# Patient Record
Sex: FEMALE | Race: WHITE | Employment: OTHER | URBAN - METROPOLITAN AREA
[De-identification: names, ages, dates, MRNs, and addresses within clinical notes are randomized per-mention and may not be internally consistent; named-entity substitution may affect disease eponyms.]

---

## 2017-02-16 ENCOUNTER — PRE VISIT (OUTPATIENT)
Dept: SURGERY | Facility: CLINIC | Age: 47
End: 2017-02-16

## 2017-02-16 NOTE — TELEPHONE ENCOUNTER
Records received from [facility].  Included:  Office notes 11/23/15, 1/5/16, 2/25/16, 11/29/16  12/14/15 notes from [facility]   Robotic ventral rectopexy, posterior colpopexy op-note on 1/13/16- [facility]  [facility]   CT abdomen pelvis on 11/30/16- [facility]  Defecography on 12/14/15    Missing records:  Flexible sigmoidoscopy 10/1/15  Colonoscopy 1/1/14    Faxed request for missing records and CT.

## 2017-02-16 NOTE — TELEPHONE ENCOUNTER
1.  Date/reason for appt: 2/20/17 rectal polyps   2.  Referring provider: NATE BASHIR  3.  Call to patient (Yes / No - short description): no, transferred from [facility]   4.  Previous care at / records requested from: [facility] and [facility]   5.  Other: Spoke with patient, she stated [facility] will be sending records today and she will call [facility] to have them fax records.

## 2017-02-17 NOTE — TELEPHONE ENCOUNTER
Records received from [facility], will forward to clinic.  Included:  Colonoscopy on 2/2/17 with pathology  US pelvic on 12/14/16  ED note on 11/29/16  CT abdomen pelvis on 11/30/16  Labs   Office notes on 4/25/14, 10/29/15  Pathology on 12/27/16    Faxed request to [facility].

## 2017-02-20 ENCOUNTER — OFFICE VISIT (OUTPATIENT)
Dept: SURGERY | Facility: CLINIC | Age: 47
End: 2017-02-20

## 2017-02-20 VITALS
BODY MASS INDEX: 23.13 KG/M2 | WEIGHT: 147.4 LBS | HEIGHT: 67 IN | OXYGEN SATURATION: 100 % | SYSTOLIC BLOOD PRESSURE: 129 MMHG | DIASTOLIC BLOOD PRESSURE: 76 MMHG | HEART RATE: 73 BPM

## 2017-02-20 DIAGNOSIS — K62.1 RECTAL POLYP: Primary | ICD-10-CM

## 2017-02-20 ASSESSMENT — PAIN SCALES - GENERAL: PAINLEVEL: MILD PAIN (3)

## 2017-02-20 NOTE — PROGRESS NOTES
Colon and [facility]al Surgery Clinic Note    RE: Sayda Rowe.  : 1970.  JEREMÍAS: 2017.    Reason for visit: Chronic RLQ abdominal pain.    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 Booker), who presented in 2016 with a 4-month h/o RLQ pain associated with abdominal distension. Pain only occurs upon straining to have a BM. Underwent CT, pelvic US and colonoscopy that showed a 2-cm right ovarian cyst, possible stump appendicitis, and colonoscopy was aborted at the descending colon given significant tortuosity and pain during the exam. Random endoscopic biopsies of the left colon were normal. Diagnostic laparoscopy was performed on 16 and showed RLQ adhesions, no appendix, and a small right ovarian cyst that was excised. RLQ pain persisted after this. Denies fevers, chills, recurrent full-thickness rectal prolapse, or BPR. Does acknowledge stress urinary incontinence, fecal urgency, and sensation of incomplete evacuation. Currently having 2 BM's in AM and 2 BM's at night (mushy). Does have to strain to have a BM but this not anything new for Sayda. Current GI meds include Dulcolax tabs 2 caps BID. Denies FH of CRC, polyps, or IBD.    Medical history:  Past Medical History   Diagnosis Date     Abdominal bloating      Abdominal pain, generalized      Alcohol dependence (H)      Anal fissure      Anxiety      Bipolar 1 disorder (H)      Cough      Eyelid disorder      Fatigue      Herpes simplex type 1 infection      History of appendectomy      History of endometriosis      History of fibromyalgia      History of laparoscopic cholecystectomy      History of laparoscopy      History of parotid gland excision      History of tonsillectomy and adenoidectomy      Migraine headache      Prolapsed internal hemorrhoids      Rectal prolapse        Surgical history:  Past Surgical History   Procedure Laterality Date     Davinci rectopexy N/A 2016      "Procedure: DAVINCI XI RECTOPEXY;  Surgeon: Leslie Booker MD;  Location:  OR       Family history:  Family History   Problem Relation Age of Onset     Family history unknown: Yes       Medications:  Current Outpatient Prescriptions   Medication Sig Dispense Refill     oxyCODONE-acetaminophen (PERCOCET) 5-325 MG per tablet Take 1 tablet by mouth every 6 hours as needed for moderate to severe pain 40 tablet 0     BUSPIRONE HCL PO Take 5 mg by mouth 2 times daily       multivitamin, therapeutic (THERA-VIT) TABS Take 1 tablet by mouth daily       CITALOPRAM HYDROBROMIDE PO Take 30 mg by mouth daily 1 & 1/2 of 20 mg       LAMOTRIGINE PO Take 100 mg by mouth every morning       LAMOTRIGINE PO Take 50 mg by mouth every evening       VALACYCLOVIR HCL PO Take 2,000 mg by mouth every 12 hours For 24 hours. Herpes outbreaks         Allergies:  Allergies   Allergen Reactions     Codeine      Erythromycin      Penicillins        Social history:   Social History   Substance Use Topics     Smoking status: Current Every Day Smoker     Packs/day: 0.50     Types: Cigarettes     Smokeless tobacco: Not on file     Alcohol use Yes      Comment: 2 beers/week     Marital status: single.    Physical Examination:  /76 (BP Location: Left arm)  Pulse 73  Ht 5' 7\"  Wt 147 lb 6.4 oz  SpO2 100%  BMI 23.09 kg/m2  General: Well hydrated. No acute distress.  HEENT: Normocephalic, EOM's intact. Non icteric conjunctiva. EAC's with no abnormalities. Oral mucosa well hydrated, with no exudates visualized. No cervical adenopathy palpated.  Chest: RRR. S1 and S2 normal. No murmurs. Good breath sounds bilaterally with no rhonchi or wheezing.   Abdomen: Soft, slightly distended, tender at RLQ but no rebound or guarding. No inguinal adenopathy palpated.  Extremities: Normotrophic. Distal pulses intact. ROM intact.    Investigations:  None.    Procedures:  None.    ASSESSMENT  48 y/o F with a h/o rectal prolapse, s/p ventral rectopexy in 1/2016 " now with chronic RLQ abdominal during BM's. Despite thorough w/u including extensive imaging, Flex Sig, and Dx laparoscopy; no clear etiology of her pain.     PLAN  1. Schedule for MR defecography and full colonoscopy. I will call her after these. If normal, will need referral to our pain clinic.    Time spent: 60 minutes.  >50% spent in discussing, counseling and coordinating care.    Brandan Resendiz M.D., M.Sc.     Division of Colon and Rectal Surgery  [facility]    Referring Provider:  Mitchell Boland  [facility]  [address] [place] [address]     Primary Care Provider:  Mitchell Boland    CC:  Leslie Booker MD.

## 2017-02-20 NOTE — LETTER
2017       RE: Sayda Rowe  [address]   [place] [address]     Dear Colleague,    Thank you for referring your patient, Sayda Rowe, to the [facility] COLON AND RECTAL SURGERY at [facility]. Please see a copy of my visit note below.    Colon and Rectal Surgery Clinic Note    RE: Sayda Rowe.  : 1970.  JEREMÍAS: 2017.    Reason for visit: Chronic RLQ abdominal pain.    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 Booker), who presented in 2016 with a 4-month h/o RLQ pain associated with abdominal distension. Pain only occurs upon straining to have a BM. Underwent CT, pelvic US and colonoscopy that showed a 2-cm right ovarian cyst, possible stump appendicitis, and colonoscopy was aborted at the descending colon given significant tortuosity and pain during the exam. Random endoscopic biopsies of the left colon were normal. Diagnostic laparoscopy was performed on 16 and showed RLQ adhesions, no appendix, and a small right ovarian cyst that was excised. RLQ pain persisted after this. Denies fevers, chills, recurrent full-thickness rectal prolapse, or BPR. Does acknowledge stress urinary incontinence, fecal urgency, and sensation of incomplete evacuation. Currently having 2 BM's in AM and 2 BM's at night (mushy). Does have to strain to have a BM but this not anything new for Sayda. Current GI meds include Dulcolax tabs 2 caps BID. Denies FH of CRC, polyps, or IBD.    Medical history:  Past Medical History   Diagnosis Date     Abdominal bloating      Abdominal pain, generalized      Alcohol dependence (H)      Anal fissure      Anxiety      Bipolar 1 disorder (H)      Cough      Eyelid disorder      Fatigue      Herpes simplex type 1 infection      History of appendectomy      History of endometriosis      History of fibromyalgia      History of laparoscopic cholecystectomy      History of laparoscopy   "    History of parotid gland excision      History of tonsillectomy and adenoidectomy      Migraine headache      Prolapsed internal hemorrhoids      Rectal prolapse        Surgical history:  Past Surgical History   Procedure Laterality Date     Davinci rectopexy N/A 1/13/2016     Procedure: DAVINCI XI RECTOPEXY;  Surgeon: Leslie Booker MD;  Location:  OR       Family history:  Family History   Problem Relation Age of Onset     Family history unknown: Yes       Medications:  Current Outpatient Prescriptions   Medication Sig Dispense Refill     oxyCODONE-acetaminophen (PERCOCET) 5-325 MG per tablet Take 1 tablet by mouth every 6 hours as needed for moderate to severe pain 40 tablet 0     BUSPIRONE HCL PO Take 5 mg by mouth 2 times daily       multivitamin, therapeutic (THERA-VIT) TABS Take 1 tablet by mouth daily       CITALOPRAM HYDROBROMIDE PO Take 30 mg by mouth daily 1 & 1/2 of 20 mg       LAMOTRIGINE PO Take 100 mg by mouth every morning       LAMOTRIGINE PO Take 50 mg by mouth every evening       VALACYCLOVIR HCL PO Take 2,000 mg by mouth every 12 hours For 24 hours. Herpes outbreaks         Allergies:  Allergies   Allergen Reactions     Codeine      Erythromycin      Penicillins        Social history:   Social History   Substance Use Topics     Smoking status: Current Every Day Smoker     Packs/day: 0.50     Types: Cigarettes     Smokeless tobacco: Not on file     Alcohol use Yes      Comment: 2 beers/week     Marital status: single.    Physical Examination:  /76 (BP Location: Left arm)  Pulse 73  Ht 5' 7\"  Wt 147 lb 6.4 oz  SpO2 100%  BMI 23.09 kg/m2  General: Well hydrated. No acute distress.  HEENT: Normocephalic, EOM's intact. Non icteric conjunctiva. EAC's with no abnormalities. Oral mucosa well hydrated, with no exudates visualized. No cervical adenopathy palpated.  Chest: RRR. S1 and S2 normal. No murmurs. Good breath sounds bilaterally with no rhonchi or wheezing.   Abdomen: Soft, " slightly distended, tender at RLQ but no rebound or guarding. No inguinal adenopathy palpated.  Extremities: Normotrophic. Distal pulses intact. ROM intact.    Investigations:  None.    Procedures:  None.    ASSESSMENT  48 y/o F with a h/o rectal prolapse, s/p ventral rectopexy in 1/2016 now with chronic RLQ abdominal during BM's. Despite thorough w/u including extensive imaging, Flex Sig, and Dx laparoscopy; no clear etiology of her pain.     PLAN  1. Schedule for MR defecography and full colonoscopy. I will call her after these. If normal, will need referral to our pain clinic.    Time spent: 60 minutes.  >50% spent in discussing, counseling and coordinating care.    Brandan Resendiz M.D., M.Sc.     Division of Colon and Rectal Surgery  [facility]    Referring Provider:  Mitchell Boland  [facility]  [address] [place] [address]     Primary Care Provider:  Mitchell Boland    CC:  Leslie Booker MD.

## 2017-02-20 NOTE — MR AVS SNAPSHOT
"              After Visit Summary   2017    Sayda Rowe    MRN: [id number]           Patient Information     Date Of Birth          1970        Visit Information        Provider Department      2017 3:30 PM Brandan Resendiz MD [facility] Colon and Rectal Surgery        Today's Diagnoses     Rectal polyp    -  1       Follow-ups after your visit        Who to contact     Please call your clinic at [phone] to:    Ask questions about your health    Make or cancel appointments    Discuss your medicines    Learn about your test results    Speak to your doctor   If you have compliments or concerns about an experience at your clinic, or if you wish to file a complaint, please contact [facility] Patient Relations at [phone] or email us at [email or web address]         Additional Information About Your Visit        MyChart Information     [employer] is an electronic gateway that provides easy, online access to your medical records. With [employer], you can request a clinic appointment, read your test results, renew a prescription or communicate with your care team.     To sign up for [employer] visit the website at [email or web address]   You will be asked to enter the access code listed below, as well as some personal information. Please follow the directions to create your username and password.     Your access code is: [email or web address]  Expires: 2017  6:31 AM     Your access code will  in 90 days. If you need help or a new code, please contact your [facility] Clinic or call [phone] for assistance.        Care EveryWhere ID     This is your Care EveryWhere ID. This could be used by other organizations to access your [facility] medical records  [id number]        Your Vitals Were     Pulse Height Pulse Oximetry BMI (Body Mass Index)          73 5' 7\" 100% 23.09 kg/m2         Blood Pressure from Last 3 Encounters:   17 " 129/76   01/16/16 117/68    Weight from Last 3 Encounters:   02/20/17 147 lb 6.4 oz   01/13/16 140 lb 14 oz              Today, you had the following     No orders found for display       Primary Care Provider Office Phone # Fax #    Mitchell Boland [phone]       [facility] [address] [place] [address]        Thank you!     Thank you for choosing [facility] COLON AND RECTAL SURGERY  for your care. Our goal is always to provide you with excellent care. Hearing back from our patients is one way we can continue to improve our services. Please take a few minutes to complete the written survey that you may receive in the mail after your visit with us. Thank you!             Your Updated Medication List - Protect others around you: Learn how to safely use, store and throw away your medicines at www.disposemymeds.org.          This list is accurate as of: 2/20/17  4:27 PM.  Always use your most recent med list.                   Brand Name Dispense Instructions for use    BUSPIRONE HCL PO      Take 5 mg by mouth 2 times daily       CITALOPRAM HYDROBROMIDE PO      Take 30 mg by mouth daily 1 & 1/2 of 20 mg       * LAMOTRIGINE PO      Take 100 mg by mouth every morning       * LAMOTRIGINE PO      Take 50 mg by mouth every evening       multivitamin, therapeutic Tabs tablet      Take 1 tablet by mouth daily       oxyCODONE-acetaminophen 5-325 MG per tablet    PERCOCET    40 tablet    Take 1 tablet by mouth every 6 hours as needed for moderate to severe pain       VALACYCLOVIR HCL PO      Take 2,000 mg by mouth every 12 hours For 24 hours. Herpes outbreaks       * Notice:  This list has 2 medication(s) that are the same as other medications prescribed for you. Read the directions carefully, and ask your doctor or other care provider to review them with you.

## 2017-02-20 NOTE — NURSING NOTE
"Chief Complaint   Patient presents with     Consult     Rectal polyp       Vitals:    02/20/17 1531   BP: 129/76   BP Location: Left arm   Pulse: 73   SpO2: 100%   Weight: 147 lb 6.4 oz   Height: 5' 7\"       Body mass index is 23.09 kg/(m^2).    Felisa White CMA                          "

## 2017-02-21 ENCOUNTER — TEAM CONFERENCE (OUTPATIENT)
Dept: SURGERY | Facility: CLINIC | Age: 47
End: 2017-02-21

## 2017-02-21 ENCOUNTER — TELEPHONE (OUTPATIENT)
Dept: SURGERY | Facility: CLINIC | Age: 47
End: 2017-02-21

## 2017-02-21 DIAGNOSIS — R10.31 ABDOMINAL PAIN, RIGHT LOWER QUADRANT: Primary | ICD-10-CM

## 2017-02-21 NOTE — TELEPHONE ENCOUNTER
Called patient to follow up on colonoscopy scheduling.  Patient was seen in clinic yesterday and tentative colonoscopy dates were discussed (between [facility] & [facility], as patient wanted sooner colonoscopy date).  After patient's clinic visit, Dr. Resendiz informed me that patient had requested MAC sedation, which they do not do at [facility] (current colonsocopy date held at [facility]).  Called patient and left a message informing her of this.  I offered that patient can keep [facility] date, but it would be with conscious sedation, versus waiting for Dr. Resendiz's next available [facility] date, which would be with MAC sedation. Informed patient that Dr. Resendiz does not have a preference as to which sedation.  Provided my direct number to schedule at patient's preferred location.

## 2017-02-22 NOTE — TELEPHONE ENCOUNTER
Patient left a message returning my call.  Patient states she would like MAC sedation, even if it means her colonoscopy date gets pushed out.  Called patient and left a message with my direct number to schedule.

## 2017-02-23 ENCOUNTER — TELEPHONE (OUTPATIENT)
Dept: SURGERY | Facility: CLINIC | Age: 47
End: 2017-02-23

## 2017-02-23 NOTE — TELEPHONE ENCOUNTER
Patient left a message returning my call.  Called patient and left a message informing her that I will be out of the office tomorrow, but she can call my co-worker Luana to schedule a colonoscopy with Dr. Resendiz at [facility].  I also told patient I will be back early Monday  if she would like to try and reach me then.  Provided Luana and my direct number to schedule.

## 2017-02-27 ENCOUNTER — TRANSFERRED RECORDS (OUTPATIENT)
Dept: HEALTH INFORMATION MANAGEMENT | Facility: CLINIC | Age: 47
End: 2017-02-27

## 2017-02-28 ENCOUNTER — TELEPHONE (OUTPATIENT)
Dept: SURGERY | Facility: CLINIC | Age: 47
End: 2017-02-28

## 2017-02-28 NOTE — TELEPHONE ENCOUNTER
Patient called to schedule colonoscopy.  Patient is scheduled for 4/6/17 at 1:30 pm.  Patient would like a sooner appointment if possible.  I told patient I would call if a cancellation came up.  Informed patient I will send a prep packet in the mail.  Patient has my direct number for further questions or concerns.

## 2017-03-03 ENCOUNTER — TELEPHONE (OUTPATIENT)
Dept: GASTROENTEROLOGY | Facility: OUTPATIENT CENTER | Age: 47
End: 2017-03-03

## 2017-03-03 ENCOUNTER — TELEPHONE (OUTPATIENT)
Dept: SURGERY | Facility: CLINIC | Age: 47
End: 2017-03-03

## 2017-03-03 NOTE — TELEPHONE ENCOUNTER
Patient left a message returning my call.  Patient confirms 3/9/17 at 3:00 pm for colonoscopy.  Called patient and left a message confirming her new date and time.  Asked patient to call me back, so we can figure out how to get her a prep packet in a timely fashion.  Provided my direct number.

## 2017-03-03 NOTE — TELEPHONE ENCOUNTER
Called patient and left a message offering a sooner colonoscopy date 3/9/17 at 3:00 pm.  Asked patient to confirm.  Provided my direct number.

## 2017-03-06 ENCOUNTER — DOCUMENTATION ONLY (OUTPATIENT)
Dept: SURGERY | Facility: CLINIC | Age: 47
End: 2017-03-06

## 2017-03-06 ENCOUNTER — TELEPHONE (OUTPATIENT)
Dept: GASTROENTEROLOGY | Facility: OUTPATIENT CENTER | Age: 47
End: 2017-03-06

## 2017-03-06 DIAGNOSIS — Z12.11 ENCOUNTER FOR SCREENING COLONOSCOPY: Primary | ICD-10-CM

## 2017-03-06 RX ORDER — POLYETHYLENE GLYCOL 3350 17 G/17G
POWDER, FOR SOLUTION ORAL
Qty: 238 G | Refills: 0 | Status: SHIPPED | OUTPATIENT
Start: 2017-03-06

## 2017-03-06 RX ORDER — MAGNESIUM CITRATE
296 SOLUTION, ORAL ORAL ONCE
Qty: 296 ML | Refills: 0 | Status: SHIPPED | OUTPATIENT
Start: 2017-03-06 | End: 2017-03-06

## 2017-03-06 NOTE — TELEPHONE ENCOUNTER
Patient taking any blood thinners ? no    Heart disease ? denies    Lung disease ? denies      Sleep apnea ? denies    Diabetic ? denies    Kidney disease ? denies    Dialysis ? n/a    Electronic implanted medical devices ? denies    Are you taking any narcotic pain medication ?  no What is your daily dosage ?    PTSD ? n/a    Prep instructions reviewed with patient ? Pt. Declined review.  polciy, MAC sedation plan reviewed. Advised pt. To have someone stay with her post exam.    Pharmacy : n/a    Indication for procedure :     Referring provider :

## 2017-03-07 ENCOUNTER — TEAM CONFERENCE (OUTPATIENT)
Dept: SURGERY | Facility: CLINIC | Age: 47
End: 2017-03-07

## 2017-03-09 ENCOUNTER — TRANSFERRED RECORDS (OUTPATIENT)
Dept: HEALTH INFORMATION MANAGEMENT | Facility: CLINIC | Age: 47
End: 2017-03-09

## 2017-03-09 ENCOUNTER — DOCUMENTATION ONLY (OUTPATIENT)
Dept: GASTROENTEROLOGY | Facility: OUTPATIENT CENTER | Age: 47
End: 2017-03-09

## 2017-03-09 DIAGNOSIS — K62.3 RECTAL PROLAPSE: Primary | ICD-10-CM

## 2017-03-09 RX ORDER — TRAMADOL HYDROCHLORIDE 50 MG/1
50 TABLET ORAL EVERY 6 HOURS PRN
Qty: 20 TABLET | Refills: 0 | Status: SHIPPED | OUTPATIENT
Start: 2017-03-09

## 2017-03-09 NOTE — LETTER
March 15, 2017    Sayda Rowe  [address] [address]    Dear Sayda,    The pathology from your recent colonoscopy showed benign (non-cancerous) tissue.      Dr. Resendiz recommends that you undergo a repeat colonoscopy in 5 years for surveillance. We will enter you into a recall system so you receive a reminder closer to the time that you are due for repeat examination.     Please remember that this recommendation is made with the understanding that you are not experiencing persistent changes in bowel function, bleeding per rectum, and/or significant abdominal pain. If you experience these symptoms, please contact your primary care provider for a further evaluation.     If you have any questions or concerns about the results of your colonoscopy or the appropriate follow-up, please contact the Colon and Rectal Surgery Clinic at the number listed below. Thank you.    Sincerely,    Gita David, RN, BSN  RN Care Coordinator  Colon and Rectal Surgery Clinic  [facility]  [phone], option #3        Resulted Orders   Surgical pathology exam   Result Value Ref Range    Copath Report       Patient Name: SAYDA ROWE  MR#: [id number]  Specimen #: [id number]  Collected: 3/9/2017  Received: 3/10/2017  Reported: 3/13/2017 17:38  Ordering Phy(s): LINDA TOÑO GAERTNER  Additional Phy(s): [facility]: [facility]    For improved result formatting, select 'View Enhanced Report Format'  under Linked Documents section.    SPECIMEN(S):  Sigmoid colon polyp    FINAL DIAGNOSIS:  COLON, SIGMOID POLYP, POLYPECTOMY:  - Hyperplastic polyp    I have personally reviewed all specimens and or slides, including the  listed special stains, and used them with my medical judgement to  determine the final diagnosis.    Electronically signed out by:    Duane Toth M.D., [facility]    CLINICAL HISTORY:  Right lower quadrant abdominal pain.    GROSS:  The specimen is received in formalin with  "proper patient's  identification, labeled \"sigmoid colon polyp\".  The specimen consists of  one tan soft tissue fragment measuring 0.3 x 0.3 x 0.2 cm.  Entirely  submitted in one cassette. (Dictated b y: Lizeth Rogers 3/10/2017 11:51  AM)    MICROSCOPIC:  Microscopic examination was performed.    CPT Codes:  A: 29288-TS2    TESTING LAB LOCATION:  [facility], [address]   [id number] [phone]    COLLECTION SITE:  Client[facility]  [facility]       "

## 2017-03-10 NOTE — PROGRESS NOTES
Left message for patient that I would be mailing information on the referral to PT that Dr. Resendiz has made to the [facility].

## 2017-03-13 LAB — COPATH REPORT: NORMAL

## 2024-01-15 ENCOUNTER — PRE VISIT (OUTPATIENT)
Dept: NEUROLOGY | Facility: CLINIC | Age: 54
End: 2024-01-15

## 2024-09-18 ENCOUNTER — TRANSCRIBE ORDERS (OUTPATIENT)
Dept: OTHER | Age: 54
End: 2024-09-18

## 2024-09-18 DIAGNOSIS — R29.6 FALLING: ICD-10-CM

## 2024-09-18 DIAGNOSIS — G90.3 MULTIPLE SYSTEM ATROPHY (H): Primary | ICD-10-CM

## 2024-09-18 DIAGNOSIS — G23.8 MULTIPLE SYSTEM ATROPHY (H): Primary | ICD-10-CM

## 2024-11-15 NOTE — CONFIDENTIAL NOTE
Reason for visit: Multiple system atrophy     Falling    Referring Provider: Maria Esther Tian MD   [facility]    Office Visit Notes: NA     Notes: Second opinion- Patient seen at [facility]   Provider:Shazia Mirza MD 08/22/2024           IMAGING   STATUS/LOCATION   DATE/TYPE   MRI/MRA NA     CT/CTA NA     LABS NA    EEG NA    EMG NA    NEUOROPSYCH   TEST: